# Patient Record
(demographics unavailable — no encounter records)

---

## 2017-04-03 NOTE — PDOC
History of Present Illness





- General


Chief Complaint: Pain


Stated Complaint: ABD PAIN


Time Seen by Provider: 04/03/17 13:09


History Source: Patient


Exam Limitations: No Limitations





- History of Present Illness


Travel History: No


Initial Comments: 





04/03/17 13:26


27y F no pmhx presents with abdominal pain, n/v/d, x 2 days. Pt states she 

started having lower abodminal pain that is sharp in nature, she has been 

having multiple episodes of vomiting that was yellow/green in color and having 

water diarrhea that was occasionally blood streaked.  The pt denies any fevers, 

but was noted febrile here in ED. The patient denies any recent travel and 

known sick contacts - the pt did note that she had similar sypmtoms in january 

that resolved spontaneously.  


No abd surgery in the past.  


Denies dysuria, hematuria, vag bleeding. 








Past History





- Past Medical History


Allergies/Adverse Reactions: 


 Allergies











Allergy/AdvReac Type Severity Reaction Status Date / Time


 


No Known Allergies Allergy   Verified 04/03/17 12:23











Cancer:  (LEUKEMIA)





- Psycho/Social/Smoking Cessation Hx


Suicidal Ideation: No


Smoking History: Never smoked


Information on smoking cessation initiated: No





**Review of Systems





- Review of Systems


Able to Perform ROS?: Yes


Comments:: 





04/03/17 13:29


Constitutional - no reported Fever, Chills, weakness, 


HEENT: no reported vision changes, sore throat


Respiratory: no reported cough, sob, hemoptysis


Cardiac: no reported chest pain, palpitations, light headedness, leg swelling


Abd/GI: reported abd pain, nausea, vomiting, diarrhea, blood streaked stool/

diarrhea


: no reported dysuria, frequency, discharge


Musculskelatal - no reported back pain, joint swelling


skin - no reported bruising, erythema, rash


neurological: no reported headache, numbness, focal weakness, tingling, ataxia, 

weakness


hematologic: no reported anemia, easy bruising, easy bleeding








*Physical Exam





- Vital Signs


 Last Vital Signs











Temp Pulse Resp BP Pulse Ox


 


 102.7 F H  129 H  18   126/74   98 


 


 04/03/17 12:20  04/03/17 12:20  04/03/17 12:20  04/03/17 12:20  04/03/17 12:20














- Physical Exam


Comments: 





04/03/17 13:30


GENERAL: The patient is awake, alert, and fully oriented, Nontoxic - in no 

acute distress.


HEAD: Normocephalic, atraumatic.


EYES: extraocular movements intact, sclera anicteric, conjunctiva clear.


ENT: Normal voice,  Moist mucous membranes.


NECK: Normal range of motion, supple


LUNGS: Breath sounds equal, clear to auscultation bilaterally.  No wheezes, no 

rhonchi, no rales.


HEART: tachycardic, mild diffuse tenderness greatest in RLQ/LUQ


ABDOMEN: Soft, nontender, normoactive bowel sounds.  No guarding, no rebound.  

. No CVA tenderness


EXTREMITIES: Normal range of motion, no edema.  No clubbing or cyanosis. No 

cords, erythema, or tenderness.


NEUROLOGICAL: No facial assymetry, Normal speech, 


PSYCH: Normal mood, normal affect.


SKIN: hot to touch, Dry, normal turgor,





ED Treatment Course





- LABORATORY


CBC & Chemistry Diagram: 


 04/03/17 13:40





 04/03/17 13:40





Medical Decision Making





- Medical Decision Making


04/03/17 13:30


suspect gastroenteritis


will obtain blood work


will treat supportively with fluids, zofran, tylenol


consider imaging








04/03/17 17:03


pts ct c/w pancolitis


pt did have an episode of this in january that resolved w/o intervention - ?UC/

crohns? 


pt still feeling nauseus with abomdinal pain 


will admit for further mangament of colitis


pt written for levaquin/flagyl


will discuss with dr. lópez











04/03/17 17:11


case dw dr. lópez


consult for dr. grullon placed in computer on behalf of dr. lópez.





agreed with admission for further mangament of colitis


will admit to observation in med/surg





Case discussed in detail with admitting physician including history, physical 

exam and ancillary studies.


Admitting physician has assumed care for the patient, will follow all pending 

diagnostics and will complete the evaluation and treatment. 








*DC/Admit/Observation/Transfer


Diagnosis at time of Disposition: 


 Pancolitis





- Discharge Dispostion


Condition at time of disposition: Guarded


Admit: Yes





- Referrals


Referrals: 


STAFF,NOT ON [Primary Care Provider] -

## 2017-04-03 NOTE — HP
Admitting History and Physical





- Primary Care Physician


PCP: Trinidad Richardson





- Admission


History of Present Illness: 





28yo female, came to Er  with abdominal pain, n/v/d, x 2 days. Pt states she 

started having lower abodminal pain that is sharp in nature, she has been 

having multiple episodes of vomiting that was yellow/green in color and having 

water diarrhea that was occasionally blood streaked.  The pt denies any fevers, 

she had similar symptoms in january











- Smoking History


Smoking history: Never smoked





Home Medications





- Allergies


Allergies/Adverse Reactions: 


 Allergies











Allergy/AdvReac Type Severity Reaction Status Date / Time


 


No Known Allergies Allergy   Verified 04/03/17 12:23














- Home Medications


Home Medications: 


Ambulatory Orders





NK [No Known Home Medication]  04/03/17 











Physical Examination


Vital Signs: 


 Vital Signs











Temperature  98.9 F   04/03/17 18:38


 


Pulse Rate  94 H  04/03/17 18:38


 


Respiratory Rate  18   04/03/17 18:38


 


Blood Pressure  129/91   04/03/17 18:38


 


O2 Sat by Pulse Oximetry (%)  97   04/03/17 18:38











Constitutional: Yes: No Distress


HENT: Yes: Atraumatic


Neck: Yes: Supple


Cardiovascular: Yes: Regular Rate and Rhythm


Respiratory: Yes: CTA Bilaterally


Gastrointestinal: Yes: Normal Bowel Sounds


Extremities: Yes: WNL


Neurological: Yes: Alert, Oriented





Problem List





- Problems


(1) Pancolitis


Assessment/Plan: 


npo


ivf


iv abx


will check stool for  diff ova and parasite


Code(s): K51.00 - ULCERATIVE (CHRONIC) PANCOLITIS WITHOUT COMPLICATIONS








Assessment/Plan


 Laboratory Tests











  04/03/17 04/03/17 04/03/17





  13:08 13:40 13:40


 


WBC   14.9 H 


 


RBC   5.40 H 


 


Hgb   11.7 


 


Hct   36.9 


 


MCV   68.3 L 


 


MCHC   31.9 L 


 


RDW   14.2 


 


Plt Count   268 


 


MPV   8.0 


 


Neutrophils %   88.6 H 


 


Lymphocytes %   5.9 L 


 


Monocytes %   5.0 


 


Eosinophils %   0.1 


 


Basophils %   0.4 


 


Hypochromic-Microcytic   2+ 


 


Microcytosis   2+ 


 


Sodium    138


 


Potassium    3.8


 


Chloride    100


 


Carbon Dioxide    28


 


Anion Gap    10


 


BUN    6 L


 


Creatinine    1.0


 


Creat Clearance w eGFR    > 60


 


Random Glucose    85


 


Calcium    9.1


 


Total Bilirubin    0.5


 


AST    22


 


ALT    22


 


Alkaline Phosphatase    73


 


Total Protein    7.9


 


Albumin    3.7


 


Urine Color  Yellow  


 


Urine Appearance  Clear  


 


Urine pH  5.0  


 


Ur Specific Gravity  1.021  


 


Urine Protein  1+ H  


 


Urine Glucose (UA)  Negative  


 


Urine Ketones  Negative  


 


Urine Blood  1+ H  


 


Urine Nitrite  Negative  


 


Urine Bilirubin  Negative  


 


Urine Urobilinogen  Negative  


 


Ur Leukocyte Esterase  Negative  


 


Urine RBC  2  


 


Urine WBC  3  


 


Ur Epithelial Cells  Rare  


 


Urine Mucus  Rare  


 


Urine HCG, Qual  Negative

## 2017-04-04 NOTE — CONSULT
Consult


Consult Specialty:: infectious diseases


Referred by:: Dr Richardson


Reason for Consultation:: abd pain





- History of Present Illness


Chief Complaint: abd pain and vomiting


History of Present Illness: 


26yo female,   with abdominal pain, n/v/d, x 2 days. Pt states she started 

having lower abdominal pain that is sharp in nature, she has been having 

multiple episodes of vomiting that was yellow/green in color and having water 

diarrhea that was occasionally blood streaked.  The pt denies any fevers, she 

had similar symptoms in January and was told that she had food poisoning 


currently the patient is comfortable but with abd pain which has improved








- History Source


History Provided By: Patient


Limitations to Obtaining History: No Limitations





- Smoking History


Smoking history: Never smoked





Home Medications





- Allergies


Allergies/Adverse Reactions: 


 Allergies











Allergy/AdvReac Type Severity Reaction Status Date / Time


 


No Known Allergies Allergy   Verified 04/03/17 12:23














- Home Medications


Home Medications: 


Ambulatory Orders





NK [No Known Home Medication]  04/03/17 











Review of Systems





- Review of Systems


Constitutional: reports: No Symptoms


Eyes: reports: No Symptoms


HENT: reports: No Symptoms


Neck: reports: No Symptoms


Cardiovascular: reports: No Symptoms


Respiratory: reports: No Symptoms


Gastrointestinal: reports: Abdominal Pain, Diarrhea, Nausea, Vomiting


Genitourinary: reports: No Symptoms


Musculoskeletal: reports: No Symptoms


Integumentary: reports: No Symptoms


Neurological: reports: No Symptoms


Endocrine: reports: No Symptoms


Hematology/Lymphatic: reports: No Symptoms


Psychiatric: reports: No Symptoms





Physical Exam


Vital Signs: 


 Vital Signs











Temperature  99.5 F   04/04/17 06:30


 


Pulse Rate  92 H  04/04/17 06:30


 


Respiratory Rate  20   04/04/17 06:30


 


Blood Pressure  117/62   04/04/17 06:30


 


O2 Sat by Pulse Oximetry (%)  97   04/03/17 18:38











Constitutional: Yes: Well Nourished, Mild Distress, Obese


Eyes: Yes: Conjunctiva Clear


HENT: Yes: Atraumatic


Neck: Yes: Supple, Trachea Midline


Cardiovascular: Yes: Regular Rate and Rhythm


Respiratory: Yes: Regular, CTA Bilaterally


Gastrointestinal: Yes: Soft, Hypoactive Bowel Sounds


Musculoskeletal: Yes: WNL


Extremities: Yes: WNL


Integumentary: Yes: WNL


Neurological: Yes: Alert, Oriented


Psychiatric: Yes: Alert, Oriented


Labs: 


 CBC, BMP





 04/04/17 06:30 





 04/04/17 06:30 











Imaging





- Results


Cat Scan: Report Reviewed, Image Reviewed





Assessment/Plan


patient seen


after looking at the imaging studies patient has extensive colitis





 Problem List 





- Problems


(1) Pancolitis


Code(s): K51.00 - ULCERATIVE (CHRONIC) PANCOLITIS WITHOUT COMPLICATIONS





plan


continue zosyn


will stop levaquin


patient is going to need abx for at least 5-6 days


await for gi to see the patient


keep patient npo for now


hydration


await for all other reports to be back

## 2017-04-04 NOTE — PN
Progress Note, Physician





- Current Medication List


Current Medications: 


Active Medications





Hydromorphone HCl (Dilaudid Injection -)  1 mg IVPB Q3H PRN


   PRN Reason: PAIN


Sodium Chloride (Normal Saline -)  1,000 mls @ 75 mls/hr IV ASDIR PAULINE


   Last Admin: 04/04/17 18:32 Dose:  75 mls/hr


Ondansetron HCl (Zofran Injection)  4 mg IVPB Q4H PRN


   PRN Reason: NAUSEA AND/OR VOMITING


   Last Admin: 04/04/17 09:50 Dose:  4 mg


Piperacillin Sod/Tazobactam Sod (Zosyn 3.375gm Ivpb (Pre-Docked))  3.375 gm 

IVPB Q8H-IV PAULINE


   Last Admin: 04/04/17 18:33 Dose:  3.375 gm











- Objective


Vital Signs: 


 Vital Signs











Temperature  99.2 F   04/04/17 17:04


 


Pulse Rate  74   04/04/17 17:04


 


Respiratory Rate  20   04/04/17 17:04


 


Blood Pressure  125/74   04/04/17 17:04


 


O2 Sat by Pulse Oximetry (%)  100   04/04/17 09:00











Constitutional: Yes: No Distress


HENT: Yes: Atraumatic


Neck: Yes: Supple


Cardiovascular: Yes: Regular Rate and Rhythm


Respiratory: Yes: CTA Bilaterally


Gastrointestinal: Yes: Normal Bowel Sounds


Extremities: Yes: WNL


Neurological: Yes: Alert, Oriented


Labs: 


 CBC, BMP





 04/04/17 06:30 





 04/04/17 06:30 











Problem List





- Problems


(1) Pancolitis


Assessment/Plan: 


low residue diet


ivf


iv abx


will check stool for  diff ova and parasite


gi consult done


Code(s): K51.00 - ULCERATIVE (CHRONIC) PANCOLITIS WITHOUT COMPLICATIONS

## 2017-04-04 NOTE — CON.GI
Consult


Consult Specialty:: GI


Referred by:: Dr. Richardson


Reason for Consultation:: Abdominal pain/diarrhea





- History of Present Illness


Chief Complaint: I was having abdominal pain and had diarrhea


History of Present Illness: 


27 year old woman admitted through [facility].  She was in her USOH up until this 

past sunday.  Later in the day sunday while at work she developed abdominal 

pain associated with diarrhea and nausea.  She felt too sick to drive home from 

work (she lives in [place], works in [place]) and stayed at a hotel in 

[place].  While there, she vomited twice.  She described chills however no 

fevers have been reported.  She had diarrhea while admitted which was collected 

for culture, O&P, C. Diff.  She remembers eating pancakes with sausage sunday 

morning and eating "food at work, possibly chicken and rice" Sunday evening.  

she saw some blood tinge mixed in with the diarrhea.  She denies recent travel 

or antibiotic use / sick contacts.  Overall her abdominal pain is improved and 

she feels hungry.  She describes a similar episodes in January, when she 

evaluated at the [facility] and told of food poisoning.  She denies 

chronic diarrhea / weight loss.  She has never had an upper endoscopy or 

colonoscopy.  There is no family history of colorectal cancer / IBD.





- History Source


History Provided By: Patient


Limitations to Obtaining History: No Limitations





- Past Medical History


Heme/Onc: Yes: Other (ALL at age 2-3, received chemotherapy)





- Past Surgical History


Past Surgical History: Yes: None





- Alcohol/Substance Use


Hx Alcohol Use: Yes (social)


History of Substance Use: reports: None





- Smoking History


Smoking history: Never smoked





- Social History


Usual Living Arrangement: Alone


ADL: Independent


Occupation: Works at [employer] in [place]


Place of Birth: [facility]


History of Recent Travel: Yes (Traveled to [place] 12/16-1/17)





Home Medications





- Allergies


Allergies/Adverse Reactions: 


 Allergies











Allergy/AdvReac Type Severity Reaction Status Date / Time


 


No Known Allergies Allergy   Verified 04/03/17 12:23














- Home Medications


Home Medications: 


Ambulatory Orders





NK [No Known Home Medication]  04/03/17 











Family Disease History





- Family Disease History


Family Disease History: Other: Father (Alive: CAD), Mother (Alive: Multiple 

Sclerosis), Sister (4 1/2 siblings: healthy)


Other Family History: No family history of IBD / colon cancer





Review of Systems





- Review of Systems


Constitutional: reports: Chills.  denies: Night Sweats, Unintentional Wgt. Loss


Cardiovascular: denies: Chest Pain


Respiratory: denies: SOB


Gastrointestinal: reports: Abdominal Pain, Diarrhea.  denies: Melena, Vomiting, 

Vomiting Blood





Physical Exam-GI


Vital Signs: 


 Vital Signs











Temperature  99.7 F H  04/04/17 15:43


 


Pulse Rate  92 H  04/04/17 15:43


 


Respiratory Rate  20   04/04/17 15:43


 


Blood Pressure  125/67   04/04/17 15:43


 


O2 Sat by Pulse Oximetry (%)  97   04/03/17 18:38











Constitutional: Yes: Calm


Eyes: No: Sclera Icterus


Cardiovascular: Yes: Regular Rate and Rhythm.  No: Murmur


Respiratory: Yes: CTA Bilaterally


Gastrointestinal Inspection: No: Distention


...Auscultate: Yes: Normoactive Bowel Sounds


...Palpate: No: Hepatomegaly, Splenomegaly, Tenderness


...Percussion: No: Tympanitic


...Rectal Exam: Yes: Other (With Chaperone: trace ligght brown liquid stool, 

guaiac negative)


Edema: No


Neurological: Yes: Alert, Oriented


Labs: 


 CBC, BMP





 04/04/17 06:30 





 04/04/17 06:30 





 Hepatic Panel











Total Bilirubin  0.5 mg/dL (0.2-1.0)   04/04/17  06:30    


 


AST  21 U/L (15-37)   04/04/17  06:30    


 


ALT  22 U/L (12-78)   04/04/17  06:30    


 


Alkaline Phosphatase  64 U/L ()   04/04/17  06:30    


 


Albumin  3.3 g/dl (3.4-5.0)  L  04/04/17  06:30    














Problem List





- Problems


(1) Pancolitis


Assessment/Plan: 


? infectious / new onset IBD


Stool for C. Diff, culture, O&P collected


Advance diet to low residue


Abx per ID for now


If persistentce of symptoms, would consider Flexible sigmoidoscopy later in the 

week


CBC / IBD serologies / CRP in AM 














Code(s): K51.00 - ULCERATIVE (CHRONIC) PANCOLITIS WITHOUT COMPLICATIONS

## 2017-04-05 NOTE — PN
Progress Note, Physician


History of Present Illness: 


tolerating diet





- Current Medication List


Current Medications: 


Active Medications





Acetaminophen (Tylenol -)  650 mg PO Q6H PRN


   PRN Reason: PAIN


   Last Admin: 04/05/17 10:07 Dose:  650 mg


Hydromorphone HCl (Dilaudid Injection -)  1 mg IVPB Q3H PRN


   PRN Reason: PAIN


Sodium Chloride (Normal Saline -)  1,000 mls @ 75 mls/hr IV ASDIR PAULINE


   Last Admin: 04/04/17 18:32 Dose:  75 mls/hr


Ondansetron HCl (Zofran Injection)  4 mg IVPB Q4H PRN


   PRN Reason: NAUSEA AND/OR VOMITING


   Last Admin: 04/04/17 09:50 Dose:  4 mg


Piperacillin Sod/Tazobactam Sod (Zosyn 3.375gm Ivpb (Pre-Docked))  3.375 gm 

IVPB Q8H-IV PAULINE


   Last Admin: 04/05/17 10:02 Dose:  3.375 gm











- Objective


Vital Signs: 


 Vital Signs











Temperature  98.1 F   04/05/17 14:03


 


Pulse Rate  78   04/05/17 14:03


 


Respiratory Rate  17   04/05/17 14:03


 


Blood Pressure  125/78   04/05/17 11:59


 


O2 Sat by Pulse Oximetry (%)  99   04/05/17 09:00











Constitutional: Yes: No Distress


HENT: Yes: Atraumatic


Neck: Yes: Supple


Cardiovascular: Yes: Regular Rate and Rhythm


Respiratory: Yes: CTA Bilaterally


Gastrointestinal: Yes: Normal Bowel Sounds


Extremities: Yes: WNL


Neurological: Yes: Alert, Oriented


Labs: 


 CBC, BMP





 04/04/17 06:30 





 04/04/17 06:30 











Problem List





- Problems


(1) Pancolitis


Assessment/Plan: 


tolerating diet


iv abx


id anf gi consult reviewed


Code(s): K51.00 - ULCERATIVE (CHRONIC) PANCOLITIS WITHOUT COMPLICATIONS

## 2017-04-05 NOTE — PN
Progress Note, Physician


History of Present Illness: 


patient doing well


abd pain much better


bowel movement improves





- Current Medication List


Current Medications: 


Active Medications





Acetaminophen (Tylenol -)  650 mg PO Q6H PRN


   PRN Reason: PAIN


   Last Admin: 04/05/17 10:07 Dose:  650 mg


Hydromorphone HCl (Dilaudid Injection -)  1 mg IVPB Q3H PRN


   PRN Reason: PAIN


Sodium Chloride (Normal Saline -)  1,000 mls @ 75 mls/hr IV ASDIR PAULINE


   Last Admin: 04/04/17 18:32 Dose:  75 mls/hr


Ondansetron HCl (Zofran Injection)  4 mg IVPB Q4H PRN


   PRN Reason: NAUSEA AND/OR VOMITING


   Last Admin: 04/04/17 09:50 Dose:  4 mg


Piperacillin Sod/Tazobactam Sod (Zosyn 3.375gm Ivpb (Pre-Docked))  3.375 gm 

IVPB Q8H-IV PAULINE


   Last Admin: 04/05/17 10:02 Dose:  3.375 gm











- Objective


Vital Signs: 


 Vital Signs











Temperature  98.1 F   04/05/17 14:03


 


Pulse Rate  78   04/05/17 14:03


 


Respiratory Rate  17   04/05/17 14:03


 


Blood Pressure  125/78   04/05/17 11:59


 


O2 Sat by Pulse Oximetry (%)  99   04/05/17 09:00











Constitutional: Yes: No Distress, Calm, Obese


Cardiovascular: Yes: Regular Rate and Rhythm


Respiratory: Yes: Regular, CTA Bilaterally


Gastrointestinal: Yes: Normal Bowel Sounds, Soft


Musculoskeletal: Yes: WNL


Extremities: Yes: WNL


Neurological: Yes: Alert, Oriented


Psychiatric: Yes: Alert, Oriented


Labs: 


 CBC, BMP





 04/04/17 06:30 





 04/04/17 06:30 











Assessment/Plan


patient seen


after looking at the imaging studies patient has extensive colitis





 Problem List 





- Problems


(1) Pancolitis


Code(s): K51.00 - ULCERATIVE (CHRONIC) PANCOLITIS WITHOUT COMPLICATIONS





plan


continue zosyn


continue monitoring


wbc trending down


will follow wbc

## 2017-04-05 NOTE — PN
GI Progress Note


Subjective: 


No diarrhea today


No abdominal pain


Tolerating PO














- Objective


Vital Signs: 


 Vital Signs











Temperature  98.1 F   04/05/17 17:17


 


Pulse Rate  82   04/05/17 17:17


 


Respiratory Rate  20   04/05/17 17:17


 


Blood Pressure  119/77   04/05/17 17:17


 


O2 Sat by Pulse Oximetry (%)  99   04/05/17 09:00











Constitutional: Calm


Labs: 


 CBC, BMP





 04/04/17 06:30 





 04/04/17 06:30 





Microbiology





04/04/17 13:30   Stool   Parasite Direct Smear - Neg


04/04/17 13:30   Stool   Giardia Antigen (CARLOS) - Neg


04/04/17 13:30   Stool   Clostridium difficile Antigen (CARLOS) - Neg


04/04/17 13:30   Stool   Clostridium difficile Toxin Assay - Neg


04/04/17 18:50   Stool   Norovirus GI - Pending


04/04/17 18:50   Stool   Norovirus GII - Pending


04/04/17 13:30   Stool   Parasite Permanent Smear - neg


           Stool Culture Pending





 Laboratory Tests











  04/05/17





  07:00


 


C-Reactive Protein  6.7 H














Problem List





- Problems


(1) Pancolitis


Assessment/Plan: 


Clinically much improved 


Tolerating PO and diarrhea resolved


Continue Low residue diet


Awaiting remainder of the stool studies








Code(s): K51.00 - ULCERATIVE (CHRONIC) PANCOLITIS WITHOUT COMPLICATIONS

## 2017-04-06 NOTE — PN
Progress Note, Physician


History of Present Illness: 


tolerating diet





- Current Medication List


Current Medications: 


Active Medications





Acetaminophen (Tylenol -)  650 mg PO Q6H PRN


   PRN Reason: PAIN


   Last Admin: 04/06/17 09:57 Dose:  650 mg


Hydromorphone HCl (Dilaudid Injection -)  1 mg IVPB Q3H PRN


   PRN Reason: PAIN


Sodium Chloride (Normal Saline -)  1,000 mls @ 75 mls/hr IV ASDIR PAULINE


   Last Admin: 04/06/17 14:03 Dose:  75 mls/hr


Ondansetron HCl (Zofran Injection)  4 mg IVPB Q4H PRN


   PRN Reason: NAUSEA AND/OR VOMITING


   Last Admin: 04/04/17 09:50 Dose:  4 mg


Piperacillin Sod/Tazobactam Sod (Zosyn 3.375gm Ivpb (Pre-Docked))  3.375 gm 

IVPB Q8H-IV PAULINE


   Last Admin: 04/06/17 09:53 Dose:  3.375 gm











- Objective


Vital Signs: 


 Vital Signs











Temperature  98.3 F   04/06/17 17:12


 


Pulse Rate  70   04/06/17 17:12


 


Respiratory Rate  20   04/06/17 17:12


 


Blood Pressure  105/61   04/06/17 17:12


 


O2 Sat by Pulse Oximetry (%)  100   04/06/17 09:00











Constitutional: Yes: No Distress


HENT: Yes: Atraumatic


Neck: Yes: Supple


Cardiovascular: Yes: Regular Rate and Rhythm


Respiratory: Yes: CTA Bilaterally


Gastrointestinal: Yes: Normal Bowel Sounds


Extremities: Yes: WNL


Neurological: Yes: Alert, Oriented


Labs: 


 CBC, BMP





 04/06/17 06:15 





 04/05/17 19:00 











Problem List





- Problems


(1) Pancolitis


Assessment/Plan: 


tolerating diet


iv abx


id anf gi consult reviewed


Code(s): K51.00 - ULCERATIVE (CHRONIC) PANCOLITIS WITHOUT COMPLICATIONS

## 2017-04-06 NOTE — PN
GI Progress Note


Subjective: 


No abdominal pain


Tolerating PO


No diarrhea, just feeling "gassy"


Stool studies unrevealing to date, norovirus pending





- Objective


Vital Signs: 


 Vital Signs











Temperature  98.4 F   04/06/17 09:04


 


Pulse Rate  64   04/06/17 09:04


 


Respiratory Rate  20   04/06/17 09:04


 


Blood Pressure  101/68   04/06/17 09:04


 


O2 Sat by Pulse Oximetry (%)  100   04/06/17 09:00











Constitutional: Calm


Eyes: No: Sclera Icterus


Cardiovascular: Yes: Regular Rate and Rhythm


Respiratory: Yes: CTA Bilaterally


Gastrointestinal Inspection: No: Distention


...Auscultate: Yes: Normoactive Bowel Sounds


...Palpate: No: Tenderness


Edema: No


Neurological: Yes: Alert, Oriented


Labs: 


 CBC, BMP





 04/06/17 06:15 





 04/05/17 19:00 





 Hepatic Panel











Total Bilirubin  0.5 mg/dL (0.2-1.0)   04/04/17  06:30    


 


AST  21 U/L (15-37)   04/04/17  06:30    


 


ALT  22 U/L (12-78)   04/04/17  06:30    


 


Alkaline Phosphatase  64 U/L ()   04/04/17  06:30    


 


Albumin  3.3 g/dl (3.4-5.0)  L  04/04/17  06:30    








 Laboratory Tests











  04/05/17 04/05/17





  07:00 19:00


 


C-Reactive Protein  6.7 H  5.2 H D














Problem List





- Problems


(1) Pancolitis


Assessment/Plan: 


Clinically asymptomatic at this point.  Stool studies negative to date and crp 

trending down


Upon discharge offered follow-up in office with me in 2-3 weeks.  Information 

will be provided in discharge plan.  Ms. Gallegos tells me that she thinks her 

mother made her an appointment to see a gastroenterologist in the city 4[place] and 

it may be more convenient for her to go there.  I advised that she ask for 

copies of her lab studies, CT scan results if she chooses to see the doc in 

[place]. 








Code(s): K51.00 - ULCERATIVE (CHRONIC) PANCOLITIS WITHOUT COMPLICATIONS

## 2017-04-06 NOTE — PN
Progress Note, Physician


History of Present Illness: 


patient improving


tolerating diet


still with some abd pain





- Current Medication List


Current Medications: 


Active Medications





Acetaminophen (Tylenol -)  650 mg PO Q6H PRN


   PRN Reason: PAIN


   Last Admin: 04/06/17 09:57 Dose:  650 mg


Hydromorphone HCl (Dilaudid Injection -)  1 mg IVPB Q3H PRN


   PRN Reason: PAIN


Sodium Chloride (Normal Saline -)  1,000 mls @ 75 mls/hr IV ASDIR PAULINE


   Last Admin: 04/06/17 14:03 Dose:  75 mls/hr


Ondansetron HCl (Zofran Injection)  4 mg IVPB Q4H PRN


   PRN Reason: NAUSEA AND/OR VOMITING


   Last Admin: 04/04/17 09:50 Dose:  4 mg


Piperacillin Sod/Tazobactam Sod (Zosyn 3.375gm Ivpb (Pre-Docked))  3.375 gm 

IVPB Q8H-IV PAULINE


   Last Admin: 04/06/17 09:53 Dose:  3.375 gm











- Objective


Vital Signs: 


 Vital Signs











Temperature  98.4 F   04/06/17 09:04


 


Pulse Rate  64   04/06/17 09:04


 


Respiratory Rate  20   04/06/17 09:04


 


Blood Pressure  101/68   04/06/17 09:04


 


O2 Sat by Pulse Oximetry (%)  100   04/06/17 09:00











Constitutional: Yes: Calm, Mild Distress


Cardiovascular: Yes: Regular Rate and Rhythm


Respiratory: Yes: Regular, CTA Bilaterally


Gastrointestinal: Yes: Normal Bowel Sounds, Soft


Musculoskeletal: Yes: WNL


Extremities: Yes: WNL


Neurological: Yes: Alert, Oriented


Psychiatric: Yes: Alert, Oriented


Labs: 


 CBC, BMP





 04/06/17 06:15 





 04/05/17 19:00 











Assessment/Plan


patient seen


after looking at the imaging studies patient has extensive colitis





 Problem List 





- Problems


(1) Pancolitis


Code(s): K51.00 - ULCERATIVE (CHRONIC) PANCOLITIS WITHOUT COMPLICATIONS





plan


continue zosyn


crp trending down


serology studies pending


continue current mgmt

## 2017-04-07 NOTE — PN
GI Progress Note


Subjective: 


GI NOte: No diarrhea yesterday. Had one loose small volume BM today and some 

lower abdominal cramps after eating some cheese in her omelet. Stool for 

norvirus and campylobacter still pending.  She is c/o acid reflux.





- Objective


Vital Signs: 


 Vital Signs











Temperature  98.6 F   04/07/17 10:00


 


Pulse Rate  86   04/07/17 10:00


 


Respiratory Rate  20   04/07/17 10:00


 


Blood Pressure  129/78   04/07/17 10:00


 


O2 Sat by Pulse Oximetry (%)  98   04/07/17 09:00








CBC,CMP











WBC  8.5 K/mm3 (4.0-10.0)   04/06/17  06:15    


 


RBC  4.84 M/mm3 (3.60-5.2)   04/06/17  06:15    


 


Hgb  10.8 GM/dL (10.7-15.3)   04/06/17  06:15    


 


Hct  33.4 % (32.4-45.2)   04/06/17  06:15    


 


MCV  69.1 fl (80-96)  L  04/06/17  06:15    


 


MCHC  32.3 g/dl (32.0-36.0)   04/06/17  06:15    


 


RDW  14.4 % (11.6-15.6)   04/06/17  06:15    


 


Plt Count  255 K/MM3 (134-434)   04/06/17  06:15    


 


MPV  8.3 fl (7.5-11.1)   04/06/17  06:15    


 


Neutrophils %  52.6 % (42.8-82.8)  D 04/05/17  19:00    


 


Lymphocytes %  28.9 % (8-40)  D 04/05/17  19:00    


 


Monocytes %  11.8 % (3.8-10.2)  H  04/05/17  19:00    


 


Eosinophils %  6.0 % (0-4.5)  H D 04/05/17  19:00    


 


Basophils %  0.7 % (0-2.0)  D 04/05/17  19:00    


 


Hypochromic-Microcytic  2+   04/03/17  13:40    


 


Microcytosis  2+   04/03/17  13:40    


 


Sodium  141 mmol/L (136-145)   04/05/17  19:00    


 


Potassium  3.8 mmol/L (3.5-5.1)   04/05/17  19:00    


 


Chloride  106 mmol/L ()   04/05/17  19:00    


 


Carbon Dioxide  25 mmol/L (21-32)   04/05/17  19:00    


 


Anion Gap  10  (8-16)   04/05/17  19:00    


 


BUN  7 mg/dL (7-18)   04/05/17  19:00    


 


Creatinine  0.9 mg/dL (0.55-1.02)   04/05/17  19:00    


 


Creat Clearance w eGFR  > 60  (>60)   04/04/17  06:30    


 


Random Glucose  96 mg/dL ()  D 04/05/17  19:00    


 


Calcium  8.2 mg/dL (8.5-10.1)  L  04/05/17  19:00    


 


Total Bilirubin  0.5 mg/dL (0.2-1.0)   04/04/17  06:30    


 


AST  21 U/L (15-37)   04/04/17  06:30    


 


ALT  22 U/L (12-78)   04/04/17  06:30    


 


Alkaline Phosphatase  64 U/L ()   04/04/17  06:30    


 


C-Reactive Protein  5.2 MG/DL (0.00-0.3)  H D 04/05/17  19:00    


 


Total Protein  7.1 g/dl (6.4-8.2)   04/04/17  06:30    


 


Albumin  3.3 g/dl (3.4-5.0)  L  04/04/17  06:30    








 Current Medications











Generic Name Dose Route Start Last Admin





  Trade Name Freq  PRN Reason Stop Dose Admin


 


Acetaminophen  650 mg 04/05/17 08:32 04/06/17 09:57





  Tylenol -  PO   650 mg





  Q6H PRN   Administration





  PAIN   


 


Sodium Chloride  1,000 mls @ 75 mls/hr 04/03/17 19:30 04/07/17 09:38





  Normal Saline -  IV   Not Given





  ASDIR PAULINE   


 


Ondansetron HCl  4 mg 04/03/17 20:38 04/04/17 09:50





  Zofran Injection  IVPB   4 mg





  Q4H PRN   Administration





  NAUSEA AND/OR VOMITING   


 


Piperacillin Sod/Tazobactam Sod  3.375 gm 04/04/17 02:00 04/07/17 09:42





  Zosyn 3.375gm Ivpb (Pre-Docked)  IVPB   3.375 gm





  Q8H-IV PAULINE   Administration











Constitutional: Calm


...Auscultate: Yes: Normoactive Bowel Sounds


...Palpate: Yes: Soft, Other (nontender)


Labs: 


 CBC, BMP





 04/06/17 06:15 





 04/05/17 19:00 











Assessment/Plan


Resolving infectious colitis. Will switch to lactose free diet and instructed 

to stay lactose free for 7 days. Will start PPI. NO GI objections to discharge 

as planned for tomorrow.

## 2017-04-07 NOTE — PN
Progress Note, Physician


History of Present Illness: 


doing well


abd pain better


no new issues





- Current Medication List


Current Medications: 


Active Medications





Acetaminophen (Tylenol -)  650 mg PO Q6H PRN


   PRN Reason: PAIN


   Last Admin: 04/06/17 09:57 Dose:  650 mg


Sodium Chloride (Normal Saline -)  1,000 mls @ 75 mls/hr IV ASDIR PAULINE


   Last Admin: 04/07/17 09:38 Dose:  Not Given


Ondansetron HCl (Zofran Injection)  4 mg IVPB Q4H PRN


   PRN Reason: NAUSEA AND/OR VOMITING


   Last Admin: 04/04/17 09:50 Dose:  4 mg


Piperacillin Sod/Tazobactam Sod (Zosyn 3.375gm Ivpb (Pre-Docked))  3.375 gm 

IVPB Q8H-IV PAULINE


   Last Admin: 04/07/17 09:42 Dose:  3.375 gm











- Objective


Vital Signs: 


 Vital Signs











Temperature  98.6 F   04/07/17 10:00


 


Pulse Rate  86   04/07/17 10:00


 


Respiratory Rate  20   04/07/17 10:00


 


Blood Pressure  129/78   04/07/17 10:00


 


O2 Sat by Pulse Oximetry (%)  98   04/07/17 09:00











Constitutional: Yes: No Distress, Calm


Cardiovascular: Yes: Regular Rate and Rhythm


Respiratory: Yes: Regular, CTA Bilaterally


Gastrointestinal: Yes: Normal Bowel Sounds, Soft


Musculoskeletal: Yes: WNL


Extremities: Yes: WNL


Integumentary: Yes: WNL


Neurological: Yes: Alert, Oriented


Psychiatric: Yes: Alert, Oriented


Labs: 


 CBC, BMP





 04/06/17 06:15 





 04/05/17 19:00 











Assessment/Plan


patient seen


after looking at the imaging studies patient has extensive colitis





 Problem List 





- Problems


(1) Pancolitis


Code(s): K51.00 - ULCERATIVE (CHRONIC) PANCOLITIS WITHOUT COMPLICATIONS





plan


continue zosyn


will be able to switch to oral tomorrow


serology studies pending


continue current mgmt


patient to go on augmentin 875mg twice a day for another 7 dys

## 2017-04-08 NOTE — PN
GI Progress Note


Subjective: 


GI Note: Pain and diarrhea free. No longer on IV antibiotics. Campylobacter 

cultures negative. Norovirus still pending





- Objective


Vital Signs: 


 Vital Signs











Temperature  98.0 F   04/08/17 14:51


 


Pulse Rate  73   04/08/17 14:51


 


Respiratory Rate  18   04/08/17 14:51


 


Blood Pressure  116/69   04/08/17 14:51


 


O2 Sat by Pulse Oximetry (%)  99   04/08/17 09:00











Constitutional: Calm


...Auscultate: Yes: Normoactive Bowel Sounds


...Palpate: Yes: Soft


Labs: 


 CBC, BMP





 04/06/17 06:15 





 04/05/17 19:00 











Assessment/Plan


Resolving infectious colitis. No GI objections to discharge. Again advised to 

avoid dairy for a week. Will followup with Dr Johnson.

## 2017-04-08 NOTE — DS
Physical Examination


Vital Signs: 


 Vital Signs











Temperature  98.0 F   04/08/17 14:51


 


Pulse Rate  73   04/08/17 14:51


 


Respiratory Rate  18   04/08/17 14:51


 


Blood Pressure  116/69   04/08/17 14:51


 


O2 Sat by Pulse Oximetry (%)  99   04/08/17 09:00











Labs: 


 CBC, BMP





 04/06/17 06:15 





 04/05/17 19:00 











Discharge Summary


Reason For Visit: PANCOLITIS


Current Active Problems





Pancolitis (Acute) 








Condition: Guarded





- Instructions


Referrals: 


Rudy Johnson DO [Staff Physician] - 2 Weeks


STAFF,NOT ON [Primary Care Provider] - 


Disposition: HOME





- Home Medications


Comprehensive Discharge Medication List: 


Ambulatory Orders





Amoxicillin/Potassium Clav [Augmentin 875-125 Tablet] 1 each PO BID #14 tablet 

04/07/17 





dc home


fu gi/pmd 1 week

## 2017-04-08 NOTE — PN
Progress Note, Physician


History of Present Illness: 


doing well


no pain





- Current Medication List


Current Medications: 


Active Medications





Acetaminophen (Tylenol -)  650 mg PO Q6H PRN


   PRN Reason: PAIN


   Last Admin: 04/06/17 09:57 Dose:  650 mg


Sodium Chloride (Normal Saline -)  1,000 mls @ 75 mls/hr IV ASDIR PAULINE


   Last Admin: 04/07/17 17:27 Dose:  75 mls/hr


Ondansetron HCl (Zofran Injection)  4 mg IVPB Q4H PRN


   PRN Reason: NAUSEA AND/OR VOMITING


   Last Admin: 04/04/17 09:50 Dose:  4 mg


Pantoprazole Sodium (Protonix -)  40 mg PO DAILY PAULINE


   Last Admin: 04/08/17 11:00 Dose:  40 mg


Piperacillin Sod/Tazobactam Sod (Zosyn 3.375gm Ivpb (Pre-Docked))  3.375 gm 

IVPB Q8H-IV PAULINE


   Last Admin: 04/08/17 11:01 Dose:  Not Given











- Objective


Vital Signs: 


 Vital Signs











Temperature  98.2 F   04/08/17 06:00


 


Pulse Rate  72   04/08/17 06:00


 


Respiratory Rate  16   04/08/17 06:00


 


Blood Pressure  116/59   04/08/17 06:00


 


O2 Sat by Pulse Oximetry (%)  99   04/07/17 21:00











Constitutional: Yes: No Distress, Calm


Cardiovascular: Yes: Regular Rate and Rhythm


Respiratory: Yes: Regular, CTA Bilaterally


Gastrointestinal: Yes: Normal Bowel Sounds, Soft


Musculoskeletal: Yes: WNL


Extremities: Yes: WNL


Integumentary: Yes: WNL


Neurological: Yes: Alert, Oriented


Psychiatric: Yes: Alert, Oriented


Labs: 


 CBC, BMP





 04/06/17 06:15 





 04/05/17 19:00 











Assessment/Plan


patient seen


after looking at the imaging studies patient has extensive colitis





 Problem List 





- Problems


(1) Pancolitis


Code(s): K51.00 - ULCERATIVE (CHRONIC) PANCOLITIS WITHOUT COMPLICATIONS





plan


can change to oral abx as planned


rest as per primary

## 2017-04-08 NOTE — PN
Progress Note, Physician


History of Present Illness: 


tolerating diet


pt seen and examined on 4/7...note was not written





- Objective


Vital Signs: 


 Vital Signs











Temperature  98.0 F   04/08/17 14:51


 


Pulse Rate  73   04/08/17 14:51


 


Respiratory Rate  18   04/08/17 14:51


 


Blood Pressure  116/69   04/08/17 14:51


 


O2 Sat by Pulse Oximetry (%)  99   04/08/17 09:00











Constitutional: Yes: No Distress


HENT: Yes: Atraumatic


Neck: Yes: Supple


Cardiovascular: Yes: Regular Rate and Rhythm


Respiratory: Yes: CTA Bilaterally


Gastrointestinal: Yes: Normal Bowel Sounds


Extremities: Yes: WNL


Neurological: Yes: Alert, Oriented


Labs: 


 CBC, BMP





 04/06/17 06:15 





 04/05/17 19:00 











Problem List





- Problems


(1) Pancolitis


Assessment/Plan: 


tolerating diet


iv abx


dc in am on po abx


Code(s): K51.00 - ULCERATIVE (CHRONIC) PANCOLITIS WITHOUT COMPLICATIONS
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
